# Patient Record
Sex: MALE | Race: WHITE | Employment: FULL TIME | URBAN - METROPOLITAN AREA
[De-identification: names, ages, dates, MRNs, and addresses within clinical notes are randomized per-mention and may not be internally consistent; named-entity substitution may affect disease eponyms.]

---

## 2019-03-11 PROBLEM — E66.01 SEVERE OBESITY (HCC): Status: ACTIVE | Noted: 2019-03-11

## 2019-03-20 ENCOUNTER — HOSPITAL ENCOUNTER (OUTPATIENT)
Dept: SURGERY | Age: 44
Discharge: HOME | End: 2019-03-20

## 2019-03-21 VITALS — WEIGHT: 275 LBS | HEIGHT: 70 IN | BODY MASS INDEX: 39.37 KG/M2

## 2019-03-21 RX ORDER — LISINOPRIL AND HYDROCHLOROTHIAZIDE 10; 12.5 MG/1; MG/1
1 TABLET ORAL DAILY
COMMUNITY

## 2019-03-21 NOTE — PERIOP NOTES
Patient verified name and . Order for consent IS found in EHR and matches case posting; patient verifies procedure. Type 1B surgery, Phone assessment complete. Orders were received. Labs per surgeon: none  Labs per anesthesia protocol: K+      Patient answered medical/surgical history questions at their best of ability. All prior to admission medications documented in [facility]. Patient instructed to take the following medications the day of surgery according to anesthesia guidelines with a small sip of water: lipitor . Hold all vitamins 7 days prior to surgery and NSAIDS 5 days prior to surgery. Prescription meds to hold:prilosec    Patient instructed on the following:  Arrive at [address] [facility], time of arrival to be called the day before by 1700  NPO after midnight including gum, mints, and ice chips  Responsible adult must drive patient to the hospital, stay during surgery, and patient will need supervision 24 hours after anesthesia  Use antibacterial soap in shower the night before surgery and on the morning of surgery  All piercings must be removed prior to arrival.    Leave all valuables (money and jewelry) at home but bring insurance card and ID on       DOS. Do not wear make-up, nail polish, lotions, cologne, perfumes, powders, or oil on skin. Patient teach back successful and patient demonstrates knowledge of instruction.

## 2019-03-26 ENCOUNTER — ANESTHESIA EVENT (OUTPATIENT)
Dept: SURGERY | Age: 44
End: 2019-03-26
Payer: COMMERCIAL

## 2019-03-26 RX ORDER — FENTANYL CITRATE 50 UG/ML
100 INJECTION, SOLUTION INTRAMUSCULAR; INTRAVENOUS ONCE
Status: CANCELLED | OUTPATIENT
Start: 2019-03-26 | End: 2019-03-26

## 2019-03-27 ENCOUNTER — HOSPITAL ENCOUNTER (OUTPATIENT)
Age: 44
Setting detail: OUTPATIENT SURGERY
Discharge: HOME | End: 2019-03-27
Attending: OTOLARYNGOLOGY | Admitting: OTOLARYNGOLOGY
Payer: COMMERCIAL

## 2019-03-27 ENCOUNTER — ANESTHESIA (OUTPATIENT)
Dept: SURGERY | Age: 44
End: 2019-03-27
Payer: COMMERCIAL

## 2019-03-27 VITALS
WEIGHT: 277.6 LBS | HEIGHT: 70 IN | DIASTOLIC BLOOD PRESSURE: 99 MMHG | OXYGEN SATURATION: 94 % | RESPIRATION RATE: 16 BRPM | BODY MASS INDEX: 39.74 KG/M2 | TEMPERATURE: 98.6 F | SYSTOLIC BLOOD PRESSURE: 152 MMHG | HEART RATE: 73 BPM

## 2019-03-27 LAB
GLUCOSE BLD STRIP.AUTO-MCNC: 120 MG/DL (ref 65–100)
POTASSIUM BLD-SCNC: 3.9 MMOL/L (ref 3.5–5.1)

## 2019-03-27 PROCEDURE — 74011250637 HC RX REV CODE- 250/637: Performed by: OTOLARYNGOLOGY

## 2019-03-27 PROCEDURE — 76210000016 HC OR PH I REC 1 TO 1.5 HR: Performed by: OTOLARYNGOLOGY

## 2019-03-27 PROCEDURE — 76060000031 HC ANESTHESIA FIRST 0.5 HR: Performed by: OTOLARYNGOLOGY

## 2019-03-27 PROCEDURE — 77030006671 HC BLD MYRIN BVR BD -A: Performed by: OTOLARYNGOLOGY

## 2019-03-27 PROCEDURE — 84132 ASSAY OF SERUM POTASSIUM: CPT

## 2019-03-27 PROCEDURE — 74011250636 HC RX REV CODE- 250/636

## 2019-03-27 PROCEDURE — 76210000020 HC REC RM PH II FIRST 0.5 HR: Performed by: OTOLARYNGOLOGY

## 2019-03-27 PROCEDURE — 82962 GLUCOSE BLOOD TEST: CPT

## 2019-03-27 PROCEDURE — 77030020407 HC IV BLD WRMR ST 3M -A: Performed by: ANESTHESIOLOGY

## 2019-03-27 PROCEDURE — 76010000154 HC OR TIME FIRST 0.5 HR: Performed by: OTOLARYNGOLOGY

## 2019-03-27 PROCEDURE — 77030008669: Performed by: OTOLARYNGOLOGY

## 2019-03-27 PROCEDURE — 74011250636 HC RX REV CODE- 250/636: Performed by: ANESTHESIOLOGY

## 2019-03-27 PROCEDURE — 77030010509 HC AIRWY LMA MSK TELE -A: Performed by: ANESTHESIOLOGY

## 2019-03-27 DEVICE — IMPLANTABLE DEVICE: Type: IMPLANTABLE DEVICE | Site: EAR | Status: FUNCTIONAL

## 2019-03-27 RX ORDER — SODIUM CHLORIDE 9 MG/ML
50 INJECTION, SOLUTION INTRAVENOUS CONTINUOUS
Status: DISCONTINUED | OUTPATIENT
Start: 2019-03-27 | End: 2019-03-27 | Stop reason: HOSPADM

## 2019-03-27 RX ORDER — SODIUM CHLORIDE, SODIUM LACTATE, POTASSIUM CHLORIDE, CALCIUM CHLORIDE 600; 310; 30; 20 MG/100ML; MG/100ML; MG/100ML; MG/100ML
150 INJECTION, SOLUTION INTRAVENOUS CONTINUOUS
Status: DISCONTINUED | OUTPATIENT
Start: 2019-03-27 | End: 2019-03-27 | Stop reason: HOSPADM

## 2019-03-27 RX ORDER — HYDROCODONE BITARTRATE AND ACETAMINOPHEN 5; 325 MG/1; MG/1
1 TABLET ORAL AS NEEDED
Status: DISCONTINUED | OUTPATIENT
Start: 2019-03-27 | End: 2019-03-27 | Stop reason: HOSPADM

## 2019-03-27 RX ORDER — HYDROMORPHONE HYDROCHLORIDE 2 MG/ML
0.5 INJECTION, SOLUTION INTRAMUSCULAR; INTRAVENOUS; SUBCUTANEOUS
Status: DISCONTINUED | OUTPATIENT
Start: 2019-03-27 | End: 2019-03-27 | Stop reason: HOSPADM

## 2019-03-27 RX ORDER — ONDANSETRON 2 MG/ML
INJECTION INTRAMUSCULAR; INTRAVENOUS AS NEEDED
Status: DISCONTINUED | OUTPATIENT
Start: 2019-03-27 | End: 2019-03-27 | Stop reason: HOSPADM

## 2019-03-27 RX ORDER — FAMOTIDINE 20 MG/1
20 TABLET, FILM COATED ORAL ONCE
Status: DISCONTINUED | OUTPATIENT
Start: 2019-03-27 | End: 2019-03-27 | Stop reason: HOSPADM

## 2019-03-27 RX ORDER — CIPROFLOXACIN 0.5 MG/.25ML
SOLUTION/ DROPS AURICULAR (OTIC) AS NEEDED
Status: DISCONTINUED | OUTPATIENT
Start: 2019-03-27 | End: 2019-03-27 | Stop reason: HOSPADM

## 2019-03-27 RX ORDER — LIDOCAINE HYDROCHLORIDE 10 MG/ML
0.1 INJECTION INFILTRATION; PERINEURAL AS NEEDED
Status: DISCONTINUED | OUTPATIENT
Start: 2019-03-27 | End: 2019-03-27 | Stop reason: HOSPADM

## 2019-03-27 RX ORDER — ACETAMINOPHEN 500 MG
1000 TABLET ORAL
Status: DISCONTINUED | OUTPATIENT
Start: 2019-03-27 | End: 2019-03-27 | Stop reason: HOSPADM

## 2019-03-27 RX ORDER — PROPOFOL 10 MG/ML
INJECTION, EMULSION INTRAVENOUS AS NEEDED
Status: DISCONTINUED | OUTPATIENT
Start: 2019-03-27 | End: 2019-03-27 | Stop reason: HOSPADM

## 2019-03-27 RX ORDER — MIDAZOLAM HYDROCHLORIDE 1 MG/ML
2 INJECTION, SOLUTION INTRAMUSCULAR; INTRAVENOUS
Status: COMPLETED | OUTPATIENT
Start: 2019-03-27 | End: 2019-03-27

## 2019-03-27 RX ORDER — FENTANYL CITRATE 50 UG/ML
INJECTION, SOLUTION INTRAMUSCULAR; INTRAVENOUS AS NEEDED
Status: DISCONTINUED | OUTPATIENT
Start: 2019-03-27 | End: 2019-03-27 | Stop reason: HOSPADM

## 2019-03-27 RX ADMIN — HYDROMORPHONE HYDROCHLORIDE 0.5 MG: 2 INJECTION, SOLUTION INTRAMUSCULAR; INTRAVENOUS; SUBCUTANEOUS at 10:22

## 2019-03-27 RX ADMIN — FENTANYL CITRATE 50 MCG: 50 INJECTION, SOLUTION INTRAMUSCULAR; INTRAVENOUS at 09:47

## 2019-03-27 RX ADMIN — LIDOCAINE HYDROCHLORIDE 0.1 ML: 10 INJECTION, SOLUTION INFILTRATION; PERINEURAL at 08:20

## 2019-03-27 RX ADMIN — ONDANSETRON 4 MG: 2 INJECTION INTRAMUSCULAR; INTRAVENOUS at 09:47

## 2019-03-27 RX ADMIN — PROPOFOL 200 MG: 10 INJECTION, EMULSION INTRAVENOUS at 09:47

## 2019-03-27 RX ADMIN — FENTANYL CITRATE 50 MCG: 50 INJECTION, SOLUTION INTRAMUSCULAR; INTRAVENOUS at 09:39

## 2019-03-27 RX ADMIN — MIDAZOLAM 2 MG: 1 INJECTION INTRAMUSCULAR; INTRAVENOUS at 08:39

## 2019-03-27 RX ADMIN — SODIUM CHLORIDE, SODIUM LACTATE, POTASSIUM CHLORIDE, AND CALCIUM CHLORIDE 150 ML/HR: 600; 310; 30; 20 INJECTION, SOLUTION INTRAVENOUS at 08:20

## 2019-03-27 NOTE — ANESTHESIA PREPROCEDURE EVALUATION
Anesthetic History   No history of anesthetic complications            Review of Systems / Medical History  Patient summary reviewed, nursing notes reviewed and pertinent labs reviewed    Pulmonary  Within defined limits                 Neuro/Psych   Within defined limits           Cardiovascular    Hypertension: well controlled              Exercise tolerance: >4 METS     GI/Hepatic/Renal     GERD: well controlled           Endo/Other    Diabetes: well controlled, type 2    Morbid obesity     Other Findings              Physical Exam    Airway  Mallampati: III  TM Distance: 4 - 6 cm  Neck ROM: normal range of motion   Mouth opening: Normal     Cardiovascular  Regular rate and rhythm,  S1 and S2 normal,  no murmur, click, rub, or gallop  Rhythm: regular  Rate: normal         Dental  No notable dental hx       Pulmonary  Breath sounds clear to auscultation               Abdominal         Other Findings            Anesthetic Plan    ASA: 3  Anesthesia type: general          Induction: Intravenous  Anesthetic plan and risks discussed with: Patient and Spouse

## 2019-03-27 NOTE — ANESTHESIA POSTPROCEDURE EVALUATION
Procedure(s):  RIGHT T TUBE PLACEMENT. general    Anesthesia Post Evaluation      Multimodal analgesia: multimodal analgesia not used between 6 hours prior to anesthesia start to PACU discharge  Patient location during evaluation: bedside  Patient participation: complete - patient participated  Level of consciousness: awake and alert  Pain management: adequate  Airway patency: patent  Anesthetic complications: no  Cardiovascular status: hemodynamically stable  Respiratory status: spontaneous ventilation  Hydration status: euvolemic  Comments: Patient stable and may discharge at this time.         Vitals Value Taken Time   /99 3/27/2019 11:00 AM   Temp 37 °C (98.6 °F) 3/27/2019 10:00 AM   Pulse 73 3/27/2019 11:00 AM   Resp 16 3/27/2019 11:00 AM   SpO2 94 % 3/27/2019 11:00 AM

## 2019-03-27 NOTE — BRIEF OP NOTE
BRIEF OPERATIVE NOTE Date of Procedure: 3/27/2019 Preoperative Diagnosis: Right otitis media with effusion [H65.91] Postoperative Diagnosis: Right otitis media with effusion [H65.91] Procedure(s): RIGHT T TUBE PLACEMENT Surgeon(s) and Role: Mariana Love MD - Primary Surgical Staff: 
Circ-1: Abby Grewal RN Scrub Tech-2: Clifford Soto Event Time In Time Out Incision Start [phone] Incision Close 1547 Anesthesia: General  
 
IVF: 200 cc Estimated Blood Loss: minimal 
 
Specimens: * No specimens in log * Findings: obstructed t-tube w/ serous effusion on R side Complications: none Implants:  
Implant Name Type Inv. Item Serial No.  Lot No. LRB No. Used Action TUBE EAR VNT T 5X8 1. 14MM --  - [id number]  TUBE EAR VNT T 5X8 1. 14MM --  [id number] [employer] [id number] Right 1 Implanted

## 2019-03-27 NOTE — OP NOTES
[place]  OPERATIVE REPORT    Name:  Angela Sanders  MR#:  [id number]  :  1975  ACCOUNT #:  [de-identified]  DATE OF SERVICE:  2019    PREOPERATIVE DIAGNOSIS:  Right-sided chronic otitis media with effusion. POSTOPERATIVE DIAGNOSIS:   Right-sided chronic otitis media with effusion. PROCEDURE PERFORMED:  Right T-tube placement. SURGEON:  Osvaldo Patrick. Flip Rutledge MD    ANESTHESIA:  General endotracheal (LMA)    ANESTHESIOLOGIST:  Franck Lozada. Grant Disla MD    COMPLICATIONS:  None. SPECIMENS REMOVED:  None. ESTIMATED BLOOD LOSS:  Minimal.    OPERATIVE FINDINGS:  The previously placed T-tube was obstructed and there was a serous middle ear effusion. The tube was removed and a new modified Reynolds T-tube was placed. IV FLUID:  200 mL of crystalloid. DRAINS:  None. DISPOSITION:  PACU, then home. CONDITION:  Stable. BRIEF HISTORY:  The patient is a 71-year-old male, who has a long history of right-sided chronic otitis media with effusion. I placed a T-tube in his ear over 2 years ago and he had done great until recently, when he had noted some fullness and muffled hearing on that side. He was examined in the office and the tube had become obstructed and was nonfunctional.  He had re-accumulated the effusion. Therefore, the decision was made to take him to the operating room for placement of a new tube. DESCRIPTION OF PROCEDURE:  The patient was brought back to the operating room and placed on the table in a supine position. General endotracheal anesthesia was inducted without any complications. A laryngeal mask airway was placed, and once he was adequately sedated, the operating microscope was brought into the field. I began on the right side. There was a normal-appearing external ear and a clean ear canal with no cerumen or other debris. There was a T-tube in place, which was obstructed and completely crusted over.   This was carefully removed with a Menezes needle and Alligator forceps. There was a small amount of granulation tissue seen at the myringotomy site, which was lightly debrided using suction and the Menezes needle. I enlarged the myringotomy anteriorly and after suctioning and irrigating out the middle ear space, a new modified Reynolds T-tube was placed without difficulty. Ciprodex ear drops were applied followed by a small piece of cotton within the external auditory meatus. This concluded the surgical portion of the procedure. The patient was then awakened from anesthesia and taken to the PACU in stable condition afterwards.       Erendira Edwards MD      [id number]  D:  03/27/2019 9:58  T:  03/27/2019 10:30  JOB #:  [id number]

## 2019-03-27 NOTE — H&P
45 yo male w/ long h/o R OME. He is s/p t-tube placement back in '16. I had placed a RB tube in office initially but it fell out early and we placed a t-tube instead. He had done well until past wk or so when his R ear has stopped up and there is muffled hearing as well. Denies any sxs on L side. Past Medical History:  
Diagnosis Date  Diabetes ([facility] [address] Type, oral med, no home checks  GERD (gastroesophageal reflux disease) prilosec controls  Hearing loss, mixed conductive and sensorineural   
 Hypertension  Middle ear effusion  SNHL (sensorineural hearing loss) Past Surgical History:  
Procedure Laterality Date  HX TYMPANOSTOMY Right 08/24/2016 Social History Socioeconomic History  Marital status:  Spouse name: Not on file  Number of children: Not on file  Years of education: Not on file  Highest education level: Not on file Occupational History  Not on file Social Needs  Financial resource strain: Not on file  Food insecurity:  
  Worry: Not on file Inability: Not on file  Transportation needs:  
  Medical: Not on file Non-medical: Not on file Tobacco Use  Smoking status: Never Smoker  Smokeless tobacco: Never Used Substance and Sexual Activity  Alcohol use: Yes Comment: moderate; 1 case a week  Drug use: No  
 Sexual activity: Not on file Lifestyle  Physical activity:  
  Days per week: Not on file Minutes per session: Not on file  Stress: Not on file Relationships  Social connections:  
  Talks on phone: Not on file Gets together: Not on file Attends Nondenominational service: Not on file Active member of club or organization: Not on file Attends meetings of clubs or organizations: Not on file Relationship status: Not on file  Intimate partner violence:  
  Fear of current or ex partner: Not on file Emotionally abused: Not on file Physically abused: Not on file Forced sexual activity: Not on file Other Topics Concern  Not on file Social History Narrative  Not on file Family History Problem Relation Age of Onset  No Known Problems Mother  No Known Problems Father No Known Allergies No current facility-administered medications on file prior to encounter. Current Outpatient Medications on File Prior to Encounter Medication Sig Dispense Refill  atorvastatin (LIPITOR) 40 mg tablet Take 40 mg by mouth. Take / use AM day of surgery  per anesthesia protocols.  glucose blood VI test strips (ASCENSIA AUTODISC VI, ONE TOUCH ULTRA TEST VI) strip by Not Applicable route.  Blood-Glucose Meter (ACCU-CHEK RANDY PLUS METER) [facility] USE AS INSTRUCTED  metFORMIN ER (GLUCOPHAGE XR) 500 mg tablet Take 500 mg by mouth.  omeprazole (PRILOSEC OTC) 20 mg tablet Take 20 mg by mouth daily. Take / use AM day of surgery  per anesthesia protocols. Indications: GASTROESOPHAGEAL REFLUX EXAM: 
Visit Vitals /86 (BP 1 Location: Right arm, BP Patient Position: At rest;Sitting) Pulse 79 Temp 98.1 °F (36.7 °C) Resp 16 Ht 5' 10\" (1.778 m) Wt 277 lb 9.6 oz (125.9 kg) SpO2 97% BMI 39.83 kg/m² General: NAD, well-appearing Neuro: No gross neuro deficits. No facial weakness. Eyes: No periorbital edema/ecchymosis. No nystagmus. Skin: No facial erythema, rashes or concerning lesions. Nose: No external deviations or saddling. Intranasally, septum is midline without perforations, nasal mucosa appears healthy with no erythema, mucopurulence, or polyps. Mouth: Moist mucus membranes, normal tongue/palate mobility, no concerning mucosal lesions. Oropharynx clear with no erythema/exudate, no tonsillar hypertrophy. Ears: Normal appearing auricles, no hematomas. R side- t-tube sitting obliquely oriented in TM- still in place but crusted over w/ non-patent lumen. ME space is serous ESTRELLITA present.  L side- no tube, intact TM, clear ME space. Neck: Soft, supple, no palpable neck masses. No thyromegaly or palpable thyroid nodules. Lymphatics: No palpable cervical LAD. Resp: No audible stridor or wheezing. Extremities: No clubbing or cyanosis. A/P: 
His previously placed R t-tube has crusted over and he had a persistent R ESTRELLITA and flat tymp. Therefore, I recommend taking him to OR for placement of a new R t-tube. I discussed all the risks of surgery including otorrhea, rejection of tubes, hearing loss, TM perforation, and need for further procedures, and he would like to proceed.  
  
HTC

## 2022-03-19 PROBLEM — E66.01 SEVERE OBESITY (HCC): Status: ACTIVE | Noted: 2019-03-11

## 2022-08-08 ENCOUNTER — OFFICE VISIT (OUTPATIENT)
Dept: ENT CLINIC | Age: 47
End: 2022-08-08
Payer: COMMERCIAL

## 2022-08-08 VITALS
WEIGHT: 293 LBS | BODY MASS INDEX: 41.02 KG/M2 | HEIGHT: 71 IN | DIASTOLIC BLOOD PRESSURE: 90 MMHG | SYSTOLIC BLOOD PRESSURE: 150 MMHG

## 2022-08-08 DIAGNOSIS — R04.0 EPISTAXIS: Primary | ICD-10-CM

## 2022-08-08 DIAGNOSIS — H90.6 MIXED HEARING LOSS, BILATERAL: ICD-10-CM

## 2022-08-08 DIAGNOSIS — Z45.89 TYMPANOSTOMY TUBE CHECK: ICD-10-CM

## 2022-08-08 PROCEDURE — 92557 COMPREHENSIVE HEARING TEST: CPT | Performed by: AUDIOLOGIST

## 2022-08-08 PROCEDURE — 99213 OFFICE O/P EST LOW 20 MIN: CPT | Performed by: OTOLARYNGOLOGY

## 2022-08-08 PROCEDURE — 92567 TYMPANOMETRY: CPT | Performed by: AUDIOLOGIST

## 2022-08-08 RX ORDER — ATORVASTATIN CALCIUM 40 MG/1
40 TABLET, FILM COATED ORAL DAILY
COMMUNITY
Start: 2021-10-13 | End: 2022-10-26

## 2022-08-08 ASSESSMENT — ENCOUNTER SYMPTOMS
SHORTNESS OF BREATH: 0
ABDOMINAL PAIN: 0

## 2022-08-08 NOTE — PROGRESS NOTES
Chief Complaint   Patient presents with    Ear Problem     Patient states that he pain in his right ear and has a hard time hearing out of both ears. Epistaxis     Patient states that he was having really bad nosebleeds and that at one point they were everyday. HPI:  Allan Galvez is a 40 y.o. male seen in follow-up for his ears and nose. He has h/o R COM and I had placed a R t-tube in that ear most recently back in Jan '20. He has had several tubes in the past as well. Today, he reports more muffled hearing on R side over past several mo. He had some R sided otorrhea (even green in color) about 2-3 mo ago. There never was any pain at all. The drainage lasted for about 1 mo. That ear is dry now. He tried some ear drops which seemed to help. No sxs on L side. On another note, he also has been dealing w/ recurrent R sided epistaxis- occurring several times a day. He used pressure to help stop the bleeding. Last episode of bleeding was about 2 wks ago. No recent nasal trauma. Has not started any new medications. Past Medical History, Past Surgical History, Family history, Social History, and Medications were all reviewed with the patient today and updated as necessary. No Known Allergies  Patient Active Problem List   Diagnosis    Severe obesity ([address]    Hearing loss, mixed conductive and sensorineural    Middle ear effusion    SNHL (sensorineural hearing loss)     Current Outpatient Medications   Medication Sig    atorvastatin (LIPITOR) 40 MG tablet Take 40 mg by mouth in the morning. lisinopril-hydroCHLOROthiazide (PRINZIDE;ZESTORETIC) 10-12.5 MG per tablet Take 1 tablet by mouth daily    metFORMIN (GLUCOPHAGE-XR) 500 MG extended release tablet Take 500 mg by mouth    omeprazole (PRILOSEC OTC) 20 MG tablet Take 20 mg by mouth daily     No current facility-administered medications for this visit.      Past Medical History:   Diagnosis Date    Diabetes ([address]     Type, oral med, no home checks GERD (gastroesophageal reflux disease)     prilosec controls    Hearing loss, mixed conductive and sensorineural     Hypertension     Middle ear effusion     OME (otitis media with effusion), right     SNHL (sensorineural hearing loss)      Social History     Tobacco Use    Smoking status: Never    Smokeless tobacco: Never   Substance Use Topics    Alcohol use: Yes     Past Surgical History:   Procedure Laterality Date    TYMPANOSTOMY TUBE PLACEMENT Right 03/27/2019    R t-tube placement    TYMPANOSTOMY TUBE PLACEMENT Right 08/24/2016     Family History   Problem Relation Age of Onset    No Known Problems Father     No Known Problems Mother         ROS:    Review of Systems   Constitutional:  Negative for fever. Eyes:  Negative for visual disturbance. Respiratory:  Negative for shortness of breath. Cardiovascular:  Negative for chest pain. Gastrointestinal:  Negative for abdominal pain. Skin:  Negative for rash. Neurological:  Negative for dizziness and headaches. Hematological:  Negative for adenopathy. Psychiatric/Behavioral:  Negative for agitation. PHYSICAL EXAM:    BP (!) 150/90 (Site: Left Upper Arm, Position: Sitting)   Ht 5' 10.5\" (1.791 m)   Wt 293 lb (132.9 kg)   BMI 41.45 kg/m²     General: NAD, well-appearing  Neuro: No gross neuro deficits. No facial weakness. Eyes: No periorbital edema/ecchymosis. No nystagmus. Skin: No facial erythema, rashes or concerning lesions. Nose: No external deviations or saddling. Intranasally, septum is midline without perforations, nasal mucosa appears slightly dry with no erythema, mucopurulence, or polyps. Mouth: Moist mucus membranes, normal tongue/palate mobility, no concerning mucosal lesions. Oropharynx clear with no erythema/exudate, no tonsillar hypertrophy. Ears: Normal appearing auricles, no hematomas. T-tube in place on R side but obliquely oriented and appears obstructed- ME space is dulled.  L side- clear canal, no tube, intact TM, clear ME space. Neck: Soft, supple, no palpable neck masses. No palpable parotid or submandibular masses. No thyromegaly or palpable thyroid nodules. Lymphatics: No palpable cervical LAD. Resp: No audible stridor or wheezing. CV: No murmurs, no JVD. Extremities: No clubbing or cyanosis. ASSESSMENT and PLAN      ICD-10-CM    1. Epistaxis  R04.0       2. Tympanostomy tube check  Z45.89       3. Mixed hearing loss, bilateral  H90.6 COMPREHENSIVE HEARING TEST     TYMPANOMETRY        His t-tube was still in place but obliquely oriented and obstructed. His audiogram revealed significant drop in hearing on both sides. There was a mixed loss on R side, c/w a non-functional tube but the L side was mostly sensorineural. His nasal exam was clear other than some dried mucosa and he has not had any bleeding in past 2 wks. He will ihcrease nasal hydration w/ saline sprays and Vaseline to both nares and call me if the bleeding recurs. At this time, I recommend proceeding w/ replacement of his R t-tube. I discussed all the risks of surgery including otorrhea, rejection of tubes, hearing loss, TM perforation, and need for further procedures, and he would like to proceed. He needs to wait to ensure that his new insurance has kicked in and he will then call [facility] [place] to schedule.      Daryle Haver, MD  8/8/2022    Electronically signed by Daryle Haver, MD on 8/8/2022 at 9:05 AM

## 2022-09-14 ENCOUNTER — TELEPHONE (OUTPATIENT)
Dept: ENT CLINIC | Age: 47
End: 2022-09-14

## 2022-09-14 NOTE — TELEPHONE ENCOUNTER
Left message for patient to call back to schedule surgery. Also asked if patient was able to get insurance straightened out.

## 2022-09-28 ENCOUNTER — PREP FOR PROCEDURE (OUTPATIENT)
Dept: ENT CLINIC | Age: 47
End: 2022-09-28

## 2022-09-28 PROBLEM — H65.91 RIGHT OTITIS MEDIA WITH EFFUSION: Status: ACTIVE | Noted: 2022-09-28

## 2022-10-13 ENCOUNTER — PREP FOR PROCEDURE (OUTPATIENT)
Dept: ENT CLINIC | Age: 47
End: 2022-10-13

## 2022-10-13 DIAGNOSIS — H65.91 RIGHT OTITIS MEDIA WITH EFFUSION: Primary | ICD-10-CM

## 2022-10-21 NOTE — PERIOP NOTE
Patient verified name and . Order for consent found in EHR and matches case posting; patient verifies procedure. Type 1B surgery, PAT phone assessment complete. Orders received. Labs per surgeon: none  Labs per anesthesia protocol: K+ DOS. Pt states unable to come for lae work prior to surgery. Patient answered medical/surgical history questions at their best of ability. All prior to admission medications documented in [facility]. Patient instructed to take the following medications the day of surgery according to anesthesia guidelines with a small sip of water: prilosec and lipitor. On the day before surgery please take Acetaminophen 1000mg in the morning and then again before bed. You may substitute for Tylenol 650 mg. Hold all vitamins 7 days prior to surgery and NSAIDS 5 days prior to surgery. Patient instructed on the following:    > Arrive at A Entrance, time of arrival to be called the day before by 1700  > NPO after midnight, unless otherwise indicated, including gum, mints, and ice chips  > Responsible adult must drive patient to the hospital, stay during surgery, and patient will need supervision 24 hours after anesthesia  > Use antibacterial soap in shower the night before surgery and on the morning of surgery  > All piercings must be removed prior to arrival.    > Leave all valuables (money and jewelry) at home but bring insurance card and ID on DOS.   > You may be required to pay a deductible or co-pay on the day of your procedure. You can pre-pay by calling [phone] if your surgery is at the [facility] or [phone] if your surgery is at the [facility]. > Do not wear make-up, nail polish, lotions, cologne, perfumes, powders, or oil on skin. Artificial nails are not permitted.

## 2022-10-25 ENCOUNTER — ANESTHESIA EVENT (OUTPATIENT)
Dept: SURGERY | Age: 47
End: 2022-10-25
Payer: COMMERCIAL

## 2022-10-25 RX ORDER — OFLOXACIN 3 MG/ML
5 SOLUTION AURICULAR (OTIC) 2 TIMES DAILY
Qty: 10 ML | Refills: 2 | Status: SHIPPED | OUTPATIENT
Start: 2022-10-25 | End: 2022-12-24

## 2022-10-26 ENCOUNTER — ANESTHESIA (OUTPATIENT)
Dept: SURGERY | Age: 47
End: 2022-10-26
Payer: COMMERCIAL

## 2022-10-26 ENCOUNTER — HOSPITAL ENCOUNTER (OUTPATIENT)
Age: 47
Setting detail: OUTPATIENT SURGERY
Discharge: HOME | End: 2022-10-26
Attending: OTOLARYNGOLOGY | Admitting: OTOLARYNGOLOGY
Payer: COMMERCIAL

## 2022-10-26 VITALS
RESPIRATION RATE: 16 BRPM | HEIGHT: 70 IN | TEMPERATURE: 97.4 F | SYSTOLIC BLOOD PRESSURE: 145 MMHG | DIASTOLIC BLOOD PRESSURE: 86 MMHG | BODY MASS INDEX: 39.2 KG/M2 | HEART RATE: 77 BPM | OXYGEN SATURATION: 94 % | WEIGHT: 273.8 LBS

## 2022-10-26 DIAGNOSIS — H65.91 RIGHT OTITIS MEDIA WITH EFFUSION: ICD-10-CM

## 2022-10-26 LAB
GLUCOSE BLD STRIP.AUTO-MCNC: 144 MG/DL (ref 65–100)
POTASSIUM BLD-SCNC: 4.2 MMOL/L (ref 3.5–5.1)
SERVICE CMNT-IMP: ABNORMAL

## 2022-10-26 PROCEDURE — 84132 ASSAY OF SERUM POTASSIUM: CPT

## 2022-10-26 PROCEDURE — 3600000012 HC SURGERY LEVEL 2 ADDTL 15MIN: Performed by: OTOLARYNGOLOGY

## 2022-10-26 PROCEDURE — 3700000000 HC ANESTHESIA ATTENDED CARE: Performed by: OTOLARYNGOLOGY

## 2022-10-26 PROCEDURE — 6360000002 HC RX W HCPCS: Performed by: NURSE ANESTHETIST, CERTIFIED REGISTERED

## 2022-10-26 PROCEDURE — 2709999900 HC NON-CHARGEABLE SUPPLY: Performed by: OTOLARYNGOLOGY

## 2022-10-26 PROCEDURE — 6370000000 HC RX 637 (ALT 250 FOR IP): Performed by: OTOLARYNGOLOGY

## 2022-10-26 PROCEDURE — 82962 GLUCOSE BLOOD TEST: CPT

## 2022-10-26 PROCEDURE — 6360000002 HC RX W HCPCS: Performed by: STUDENT IN AN ORGANIZED HEALTH CARE EDUCATION/TRAINING PROGRAM

## 2022-10-26 PROCEDURE — 3600000002 HC SURGERY LEVEL 2 BASE: Performed by: OTOLARYNGOLOGY

## 2022-10-26 PROCEDURE — 3700000001 HC ADD 15 MINUTES (ANESTHESIA): Performed by: OTOLARYNGOLOGY

## 2022-10-26 PROCEDURE — 7100000010 HC PHASE II RECOVERY - FIRST 15 MIN: Performed by: OTOLARYNGOLOGY

## 2022-10-26 PROCEDURE — 7100000000 HC PACU RECOVERY - FIRST 15 MIN: Performed by: OTOLARYNGOLOGY

## 2022-10-26 PROCEDURE — 2500000003 HC RX 250 WO HCPCS: Performed by: NURSE ANESTHETIST, CERTIFIED REGISTERED

## 2022-10-26 PROCEDURE — 7100000001 HC PACU RECOVERY - ADDTL 15 MIN: Performed by: OTOLARYNGOLOGY

## 2022-10-26 PROCEDURE — L8699 PROSTHETIC IMPLANT NOS: HCPCS | Performed by: OTOLARYNGOLOGY

## 2022-10-26 PROCEDURE — 69436 CREATE EARDRUM OPENING: CPT | Performed by: OTOLARYNGOLOGY

## 2022-10-26 PROCEDURE — 2580000003 HC RX 258: Performed by: STUDENT IN AN ORGANIZED HEALTH CARE EDUCATION/TRAINING PROGRAM

## 2022-10-26 DEVICE — VENT TUBE 1056019 5PK T TUBE 1.14MM
Type: IMPLANTABLE DEVICE | Site: EAR | Status: FUNCTIONAL
Brand: GOODE T-TUBE®

## 2022-10-26 RX ORDER — HYDRALAZINE HYDROCHLORIDE 20 MG/ML
10 INJECTION INTRAMUSCULAR; INTRAVENOUS
Status: DISCONTINUED | OUTPATIENT
Start: 2022-10-26 | End: 2022-10-26 | Stop reason: HOSPADM

## 2022-10-26 RX ORDER — PROPOFOL 10 MG/ML
INJECTION, EMULSION INTRAVENOUS PRN
Status: DISCONTINUED | OUTPATIENT
Start: 2022-10-26 | End: 2022-10-26 | Stop reason: SDUPTHER

## 2022-10-26 RX ORDER — SODIUM CHLORIDE 9 MG/ML
INJECTION, SOLUTION INTRAVENOUS PRN
Status: DISCONTINUED | OUTPATIENT
Start: 2022-10-26 | End: 2022-10-26 | Stop reason: HOSPADM

## 2022-10-26 RX ORDER — LIDOCAINE HYDROCHLORIDE 20 MG/ML
INJECTION, SOLUTION EPIDURAL; INFILTRATION; INTRACAUDAL; PERINEURAL PRN
Status: DISCONTINUED | OUTPATIENT
Start: 2022-10-26 | End: 2022-10-26 | Stop reason: SDUPTHER

## 2022-10-26 RX ORDER — PROCHLORPERAZINE EDISYLATE 5 MG/ML
5 INJECTION INTRAMUSCULAR; INTRAVENOUS
Status: DISCONTINUED | OUTPATIENT
Start: 2022-10-26 | End: 2022-10-26 | Stop reason: HOSPADM

## 2022-10-26 RX ORDER — CIPROFLOXACIN 0.5 MG/.25ML
SOLUTION/ DROPS AURICULAR (OTIC) PRN
Status: DISCONTINUED | OUTPATIENT
Start: 2022-10-26 | End: 2022-10-26 | Stop reason: HOSPADM

## 2022-10-26 RX ORDER — MIDAZOLAM HYDROCHLORIDE 2 MG/2ML
2 INJECTION, SOLUTION INTRAMUSCULAR; INTRAVENOUS
Status: COMPLETED | OUTPATIENT
Start: 2022-10-26 | End: 2022-10-26

## 2022-10-26 RX ORDER — LABETALOL HYDROCHLORIDE 5 MG/ML
10 INJECTION, SOLUTION INTRAVENOUS
Status: DISCONTINUED | OUTPATIENT
Start: 2022-10-26 | End: 2022-10-26 | Stop reason: HOSPADM

## 2022-10-26 RX ORDER — OXYCODONE HYDROCHLORIDE 5 MG/1
5 TABLET ORAL PRN
Status: DISCONTINUED | OUTPATIENT
Start: 2022-10-26 | End: 2022-10-26 | Stop reason: HOSPADM

## 2022-10-26 RX ORDER — LIDOCAINE HYDROCHLORIDE 10 MG/ML
1 INJECTION, SOLUTION INFILTRATION; PERINEURAL
Status: DISCONTINUED | OUTPATIENT
Start: 2022-10-26 | End: 2022-10-26 | Stop reason: HOSPADM

## 2022-10-26 RX ORDER — OXYCODONE HYDROCHLORIDE 5 MG/1
10 TABLET ORAL PRN
Status: DISCONTINUED | OUTPATIENT
Start: 2022-10-26 | End: 2022-10-26 | Stop reason: HOSPADM

## 2022-10-26 RX ORDER — FENTANYL CITRATE 50 UG/ML
25 INJECTION, SOLUTION INTRAMUSCULAR; INTRAVENOUS
Status: DISCONTINUED | OUTPATIENT
Start: 2022-10-26 | End: 2022-10-26 | Stop reason: HOSPADM

## 2022-10-26 RX ORDER — IPRATROPIUM BROMIDE AND ALBUTEROL SULFATE 2.5; .5 MG/3ML; MG/3ML
1 SOLUTION RESPIRATORY (INHALATION)
Status: DISCONTINUED | OUTPATIENT
Start: 2022-10-26 | End: 2022-10-26 | Stop reason: HOSPADM

## 2022-10-26 RX ORDER — SODIUM CHLORIDE, SODIUM LACTATE, POTASSIUM CHLORIDE, CALCIUM CHLORIDE 600; 310; 30; 20 MG/100ML; MG/100ML; MG/100ML; MG/100ML
INJECTION, SOLUTION INTRAVENOUS CONTINUOUS
Status: DISCONTINUED | OUTPATIENT
Start: 2022-10-26 | End: 2022-10-26 | Stop reason: HOSPADM

## 2022-10-26 RX ORDER — ACETAMINOPHEN 500 MG
500 TABLET ORAL EVERY 6 HOURS PRN
COMMUNITY

## 2022-10-26 RX ORDER — DIPHENHYDRAMINE HYDROCHLORIDE 50 MG/ML
12.5 INJECTION INTRAMUSCULAR; INTRAVENOUS
Status: DISCONTINUED | OUTPATIENT
Start: 2022-10-26 | End: 2022-10-26 | Stop reason: HOSPADM

## 2022-10-26 RX ORDER — SODIUM CHLORIDE 0.9 % (FLUSH) 0.9 %
5-40 SYRINGE (ML) INJECTION EVERY 12 HOURS SCHEDULED
Status: DISCONTINUED | OUTPATIENT
Start: 2022-10-26 | End: 2022-10-26 | Stop reason: HOSPADM

## 2022-10-26 RX ORDER — HYDROMORPHONE HYDROCHLORIDE 1 MG/ML
0.5 INJECTION, SOLUTION INTRAMUSCULAR; INTRAVENOUS; SUBCUTANEOUS EVERY 5 MIN PRN
Status: DISCONTINUED | OUTPATIENT
Start: 2022-10-26 | End: 2022-10-26 | Stop reason: HOSPADM

## 2022-10-26 RX ORDER — SODIUM CHLORIDE 0.9 % (FLUSH) 0.9 %
5-40 SYRINGE (ML) INJECTION PRN
Status: DISCONTINUED | OUTPATIENT
Start: 2022-10-26 | End: 2022-10-26 | Stop reason: HOSPADM

## 2022-10-26 RX ORDER — HALOPERIDOL 5 MG/ML
1 INJECTION INTRAMUSCULAR
Status: DISCONTINUED | OUTPATIENT
Start: 2022-10-26 | End: 2022-10-26 | Stop reason: HOSPADM

## 2022-10-26 RX ORDER — FENTANYL CITRATE 50 UG/ML
100 INJECTION, SOLUTION INTRAMUSCULAR; INTRAVENOUS
Status: DISCONTINUED | OUTPATIENT
Start: 2022-10-26 | End: 2022-10-26 | Stop reason: HOSPADM

## 2022-10-26 RX ADMIN — HYDROMORPHONE HYDROCHLORIDE 0.5 MG: 1 INJECTION, SOLUTION INTRAMUSCULAR; INTRAVENOUS; SUBCUTANEOUS at 07:26

## 2022-10-26 RX ADMIN — MIDAZOLAM HYDROCHLORIDE 2 MG: 2 INJECTION, SOLUTION INTRAMUSCULAR; INTRAVENOUS at 06:39

## 2022-10-26 RX ADMIN — LIDOCAINE HYDROCHLORIDE 60 MG: 20 INJECTION, SOLUTION EPIDURAL; INFILTRATION; INTRACAUDAL; PERINEURAL at 06:59

## 2022-10-26 RX ADMIN — SODIUM CHLORIDE, POTASSIUM CHLORIDE, SODIUM LACTATE AND CALCIUM CHLORIDE: 600; 310; 30; 20 INJECTION, SOLUTION INTRAVENOUS at 06:02

## 2022-10-26 RX ADMIN — PROPOFOL 200 MG: 10 INJECTION, EMULSION INTRAVENOUS at 06:59

## 2022-10-26 ASSESSMENT — PAIN DESCRIPTION - LOCATION: LOCATION: EAR

## 2022-10-26 ASSESSMENT — PAIN DESCRIPTION - ORIENTATION: ORIENTATION: RIGHT

## 2022-10-26 ASSESSMENT — PAIN - FUNCTIONAL ASSESSMENT: PAIN_FUNCTIONAL_ASSESSMENT: 0-10

## 2022-10-26 ASSESSMENT — PAIN DESCRIPTION - DESCRIPTORS: DESCRIPTORS: THROBBING

## 2022-10-26 ASSESSMENT — PAIN SCALES - GENERAL: PAINLEVEL_OUTOF10: 7

## 2022-10-26 NOTE — OP NOTE
[facility]  OPERATIVE REPORT    Name:  Oumar Haile  MR#:  [id number]  :  1975  ACCOUNT #:  [de-identified]  DATE OF SERVICE:  10/26/2022    PREOPERATIVE DIAGNOSIS:  Right otitis media with effusion. POSTOPERATIVE DIAGNOSIS:  Right otitis media with effusion. PROCEDURE PERFORMED:  Replacement of right T-tube. SURGEON:  Anaya Daniels MD    ASSISTANT:  none. ANESTHESIA:  General mask. ANESTHESIOLOGIST:  Kwabena Haile MD    COMPLICATIONS:  None. SPECIMENS REMOVED:  None. IMPLANTS:  none. ESTIMATED BLOOD LOSS:  Minimal.    OPERATIVE FINDINGS:  There was an obstructed right T-tube which was removed and there was a thick purulent middle ear effusion present. A new T-tube was placed. IV FLUIDS:  Minimal.    DRAINS:  None. DISPOSITION:  PACU, then home. CONDITION:  Stable. BRIEF HISTORY:  The patient is a 43-year-old male with a long history of a right chronic otitis media with effusion. I had placed a tube in that ear back in 2019. He had done well until recently when it became obstructed and his right middle ear space filled back up with fluid. The decision was made to take him to the operating room for replacement of this obstructed right T-tube. DESCRIPTION OF PROCEDURE:  The patient was brought back to the operating room and placed on the table in the supine position. General mask anesthesia was inducted without any complications. Once the patient was adequately sedated, the operating microscope was brought into the field. I visualized down the right ear canal which was clean with minimal cerumen. There was a T-tube in place anteroinferiorly, but it was completely obstructed and there was a very thickened drum with an obvious middle ear effusion. I used an alligator forceps to remove the previously placed tube. There was mild granulation around the previous myringotomy site which was lightly debrided using a López needle and otologic suction.   There was thick mucopurulent drainage from the middle ear space which was suctioned and irrigated out. Next, I placed a modified Chirinos T-tube within the same myringotomy which had to be enlarged just slightly. Once it was in a good position, Ciprodex ear drops were applied followed by a small piece of cotton within the external auditory meatus. This concluded the surgical portion of the procedure. The patient was then awakened from anesthesia and taken to the PACU in stable condition afterwards.       Gilles Stone MD      [id number]  D:  10/26/2022 7:25  T:  10/26/2022 13:37  JOB #:  [id number]

## 2022-10-26 NOTE — DISCHARGE INSTRUCTIONS
-Please use Ciprodex ear drops- 3 drops TID for 3 days  -No activity restrictions  -Regular diet    After general anesthesia or intravenous sedation, for 24 hours or while taking prescription Narcotics:  Limit your activities  A responsible adult needs to be with you for the next 24 hours  Do not drive and operate hazardous machinery  Do not make important personal or business decisions  Do not drink alcoholic beverages  If you have not urinated within 8 hours after discharge, and you are experiencing discomfort from urinary retention, please go to the nearest ED. If you have sleep apnea and have a CPAP machine, please use it for all naps and sleeping. Please use caution when taking narcotics and any of your home medications that may cause drowsiness. *  Please give a list of your current medications to your Primary Care Provider. *  Please update this list whenever your medications are discontinued, doses are      changed, or new medications (including over-the-counter products) are added. *  Please carry medication information at all times in case of emergency situations. These are general instructions for a healthy lifestyle:  No smoking/ No tobacco products/ Avoid exposure to second hand smoke  Surgeon General's Warning:  Quitting smoking now greatly reduces serious risk to your health. Obesity, smoking, and sedentary lifestyle greatly increases your risk for illness  A healthy diet, regular physical exercise & weight monitoring are important for maintaining a healthy lifestyle    You may be retaining fluid if you have a history of heart failure or if you experience any of the following symptoms:  Weight gain of 3 pounds or more overnight or 5 pounds in a week, increased swelling in our hands or feet or shortness of breath while lying flat in bed. Please call your doctor as soon as you notice any of these symptoms; do not wait until your next office visit.

## 2022-10-26 NOTE — H&P
57 yo male who has h/o R COM and I had placed a R t-tube in that ear most recently back in Jan '20. He has had several tubes in the past as well. Today, he reports more muffled hearing on R side over past several mo. He had some R sided otorrhea (even green in color) about 2-3 mo ago. There never was any pain at all. The drainage lasted for about 1 mo. That ear is dry now. He tried some ear drops which seemed to help. No sxs on L side.      Past Medical History:   Diagnosis Date    Diabetes ([facility] [address]     Type 2, oral med, no home checks    GERD (gastroesophageal reflux disease)     prilosec controls    Hearing loss, mixed conductive and sensorineural     Hypertension     managed with meds    Middle ear effusion     OME (otitis media with effusion), right     SNHL (sensorineural hearing loss)      Past Surgical History:   Procedure Laterality Date    TYMPANOSTOMY TUBE PLACEMENT Right 03/27/2019    R t-tube placement    TYMPANOSTOMY TUBE PLACEMENT Right 08/24/2016     Social History     Socioeconomic History    Marital status:      Spouse name: Not on file    Number of children: Not on file    Years of education: Not on file    Highest education level: Not on file   Occupational History    Not on file   Tobacco Use    Smoking status: Never    Smokeless tobacco: Never   Vaping Use    Vaping Use: Never used   Substance and Sexual Activity    Alcohol use: Yes     Comment: occas    Drug use: No    Sexual activity: Not on file   Other Topics Concern    Not on file   Social History Narrative    Not on file     Social Determinants of Health     Financial Resource Strain: Not on file   Food Insecurity: Not on file   Transportation Needs: Not on file   Physical Activity: Not on file   Stress: Not on file   Social Connections: Not on file   Intimate Partner Violence: Not on file   Housing Stability: Not on file     Family History   Problem Relation Age of Onset    No Known Problems Father     No Known Problems Mother      No Known Allergies    No current facility-administered medications on file prior to encounter. Current Outpatient Medications on File Prior to Encounter   Medication Sig Dispense Refill    acetaminophen (TYLENOL) 500 MG tablet Take 500 mg by mouth every 6 hours as needed for Pain      atorvastatin (LIPITOR) 40 MG tablet Take 40 mg by mouth in the morning. lisinopril-hydroCHLOROthiazide (PRINZIDE;ZESTORETIC) 10-12.5 MG per tablet Take 1 tablet by mouth daily      metFORMIN (GLUCOPHAGE-XR) 500 MG extended release tablet Take 500 mg by mouth daily (with breakfast)      omeprazole (PRILOSEC OTC) 20 MG tablet Take 20 mg by mouth daily       EXAM:  BP (!) 145/88   Pulse 86   Temp 97.9 °F (36.6 °C) (Temporal)   Resp 18   Ht 5' 10\" (1.778 m)   Wt 273 lb 12.8 oz (124.2 kg)   SpO2 96%   BMI 39.29 kg/m²   General: NAD, well-appearing  Neuro: No gross neuro deficits. No facial weakness. Eyes: No periorbital edema/ecchymosis. No nystagmus. Skin: No facial erythema, rashes or concerning lesions. Nose: No external deviations or saddling. Intranasally, septum is midline without perforations, nasal mucosa appears slightly dry with no erythema, mucopurulence, or polyps. Mouth: Moist mucus membranes, normal tongue/palate mobility, no concerning mucosal lesions. Oropharynx clear with no erythema/exudate, no tonsillar hypertrophy. Ears: Normal appearing auricles, no hematomas. T-tube in place on R side but obliquely oriented and appears obstructed- ME space is dulled. L side- clear canal, no tube, intact TM, clear ME space. Neck: Soft, supple, no palpable neck masses. No palpable parotid or submandibular masses. No thyromegaly or palpable thyroid nodules. Lymphatics: No palpable cervical LAD. Resp: No audible stridor or wheezing. CV: No murmurs, no JVD. Extremities: No clubbing or cyanosis. A/P:  His t-tube was still in place but obliquely oriented and obstructed.  His audiogram revealed significant drop in hearing on both sides. There was a mixed loss on R side, c/w a non-functional tube but the L side was mostly sensorineural. . At this time, I recommend proceeding w/ replacement of his R t-tube. I discussed all the risks of surgery including otorrhea, rejection of tubes, hearing loss, TM perforation, and need for further procedures, and he would like to proceed.      [address]

## 2022-10-26 NOTE — BRIEF OP NOTE
Brief Postoperative Note      Patient: Shanel Pak  YOB: 1975  MRN: [id number]    Date of Procedure: 10/26/2022    Pre-Op Diagnosis: Right otitis media with effusion [H65.91]    Post-Op Diagnosis: Same       Procedure(s):   MYRINGOTOMY TUBE INSERTION- R t-tube    Surgeon(s):  Tal Paula MD    Assistant:  * No surgical staff found *    Anesthesia: General    Estimated Blood Loss (mL): Minimal    Complications: None    Specimens:   * No specimens in log *    Implants:  * No implants in log *      Drains: * No LDAs found *    Findings: thick CHANDU w/ obstructed R t-tube    Electronically signed by Tal Paula MD on 10/26/2022 at 7:09 AM

## 2022-10-26 NOTE — ANESTHESIA POSTPROCEDURE EVALUATION
Department of Anesthesiology  Postprocedure Note    Patient: Sacr Gold  MRN: [id number]  YOB: 1975  Date of evaluation: 10/26/2022      Procedure Summary     Date: 10/26/22 Room / Location: [facility] MAIN OR 02 / [facility] MAIN OR    Anesthesia Start: 2035 Anesthesia Stop: 0715    Procedure: MYRINGOTOMY TUBE INSERTION (Right: Ear) Diagnosis:       Right otitis media with effusion      (Right otitis media with effusion [H65.91])    Surgeons: Timothy Coronel MD Responsible Provider: Arabella Vargas MD    Anesthesia Type: general ASA Status: 3          Anesthesia Type: No value filed.     Lamar Phase I: Lamar Score: 10    Lamar Phase II: Lamar Score: 10      Anesthesia Post Evaluation    Patient location during evaluation: bedside  Patient participation: complete - patient participated  Level of consciousness: awake and alert  Pain score: 1  Airway patency: patent  Nausea & Vomiting: no vomiting  Complications: no  Cardiovascular status: hemodynamically stable  Respiratory status: acceptable  Hydration status: euvolemic

## 2022-10-26 NOTE — ANESTHESIA PRE PROCEDURE
Department of Anesthesiology  Preprocedure Note       Name:  Bryson Randhawa   Age:  56 y.o.  :  1975                                          MRN:  [id number]         Date:  10/26/2022      Surgeon: Sanju Kowalski):  Matteo Soliz MD    Procedure: Procedure(s): MYRINGOTOMY TUBE INSERTION    Medications prior to admission:   Prior to Admission medications    Medication Sig Start Date End Date Taking? Authorizing Provider   acetaminophen (TYLENOL) 500 MG tablet Take 500 mg by mouth every 6 hours as needed for Pain   Yes Historical Provider, MD   ofloxacin (FLOXIN) 0.3 % otic solution Place 5 drops into both ears 2 times daily 10/25/22 12/24/22  Matteo Soliz MD   atorvastatin (LIPITOR) 40 MG tablet Take 40 mg by mouth in the morning.  10/13/21 10/26/22  Historical Provider, MD   lisinopril-hydroCHLOROthiazide (PRINZIDE;ZESTORETIC) 10-12.5 MG per tablet Take 1 tablet by mouth daily    Ar Automatic Reconciliation   metFORMIN (GLUCOPHAGE-XR) 500 MG extended release tablet Take 500 mg by mouth daily (with breakfast) 1/3/19   Ar Automatic Reconciliation   omeprazole (PRILOSEC OTC) 20 MG tablet Take 20 mg by mouth daily    Ar Automatic Reconciliation       Current medications:    Current Facility-Administered Medications   Medication Dose Route Frequency Provider Last Rate Last Admin    lidocaine 1 % injection 1 mL  1 mL IntraDERmal Once PRN Carlotta Ludwig MD        fentaNYL (SUBLIMAZE) injection 25 mcg  25 mcg IntraVENous Once PRN Carlotta Ludwig MD        Or    fentaNYL (SUBLIMAZE) injection 100 mcg  100 mcg IntraVENous Once PRN Carlotta Ludwig MD        lactated ringers infusion   IntraVENous Continuous Carlotta Ludwig  mL/hr at 10/26/22 0602 New Bag at 10/26/22 0602    sodium chloride flush 0.9 % injection 5-40 mL  5-40 mL IntraVENous 2 times per day Carlotta Ludwig MD        sodium chloride flush 0.9 % injection 5-40 mL  5-40 mL IntraVENous PRN Carlotta Ludwig MD        0.9 % sodium chloride infusion IntraVENous PRN Esthela Draper MD           Allergies:  No Known Allergies    Problem List:    Patient Active Problem List   Diagnosis Code    [facility] E66.01    Hearing loss, mixed conductive and sensorineural H90.8    Middle ear effusion H65.90    SNHL (sensorineural hearing loss) H90.5    Right otitis media with effusion H65.91       Past Medical History:        Diagnosis Date    Diabetes ([facility] [address]     Type 2, oral med, no home checks    GERD (gastroesophageal reflux disease)     prilosec controls    Hearing loss, mixed conductive and sensorineural     Hypertension     managed with meds    Middle ear effusion     OME (otitis media with effusion), right     SNHL (sensorineural hearing loss)        Past Surgical History:        Procedure Laterality Date    TYMPANOSTOMY TUBE PLACEMENT Right 03/27/2019    R t-tube placement    TYMPANOSTOMY TUBE PLACEMENT Right 08/24/2016       Social History:    Social History     Tobacco Use    Smoking status: Never    Smokeless tobacco: Never   Substance Use Topics    Alcohol use: Yes     Comment: occas                                Counseling given: Not Answered      Vital Signs (Current):   Vitals:    10/21/22 1244 10/26/22 0530   BP:  (!) 145/88   Pulse:  86   Resp:  18   Temp:  97.9 °F (36.6 °C)   TempSrc:  Temporal   SpO2:  96%   Weight: 280 lb (127 kg) 273 lb 12.8 oz (124.2 kg)   Height: 5' 10\" (1.778 m)                                               BP Readings from Last 3 Encounters:   10/26/22 (!) 145/88   08/08/22 (!) 150/90       NPO Status: Time of last liquid consumption: 2100                        Time of last solid consumption: 2100                        Date of last liquid consumption: 10/25/22                        Date of last solid food consumption: 10/25/22    BMI:   Wt Readings from Last 3 Encounters:   10/26/22 273 lb 12.8 oz (124.2 kg)   08/08/22 293 lb (132.9 kg)     Body mass index is 39.29 kg/m².     CBC: No results found for: WBC, RBC, HGB, HCT, MCV, RDW, PLT    CMP: No results found for: NA, K, CL, CO2, BUN, CREATININE, GFRAA, AGRATIO, LABGLOM, GLUCOSE, GLU, PROT, CALCIUM, BILITOT, ALKPHOS, AST, ALT    POC Tests:   Recent Labs     10/26/22  0606 10/26/22  0612   POCGLU 144*  --    POCK  --  4.2       Coags: No results found for: PROTIME, INR, APTT    HCG (If Applicable): No results found for: PREGTESTUR, PREGSERUM, HCG, HCGQUANT     ABGs: No results found for: PHART, PO2ART, PGZ8KHW, XVI6DBB, BEART, V8LKOLIA     Type & Screen (If Applicable):  No results found for: LABABO, LABRH    Drug/Infectious Status (If Applicable):  No results found for: HIV, HEPCAB    COVID-19 Screening (If Applicable): No results found for: COVID19        Anesthesia Evaluation  Patient summary reviewed and Nursing notes reviewed no history of anesthetic complications:   Airway: Mallampati: III  TM distance: >3 FB   Neck ROM: full  Comment: beard  Mouth opening: > = 3 FB   Dental: normal exam         Pulmonary:Negative Pulmonary ROS and normal exam                               Cardiovascular:  Exercise tolerance: good (>4 METS),   (+) hypertension:,                   Neuro/Psych:   Negative Neuro/Psych ROS              GI/Hepatic/Renal:   (+) GERD: well controlled, morbid obesity          Endo/Other:    (+) DiabetesType II DM, , .                 Abdominal:             Vascular: negative vascular ROS. Other Findings:           Anesthesia Plan      general     ASA 3       Induction: intravenous. Anesthetic plan and risks discussed with patient.                         Bárbara Adame MD   10/26/2022

## (undated) DEVICE — DRAPE TWL SURG 16X26IN BLU ORB04] ALLCARE INC]

## (undated) DEVICE — COTTON BALLS 10/PK: Brand: CARDINAL HEALTH

## (undated) DEVICE — GLOVE ORANGE PI 7 1/2   MSG9075

## (undated) DEVICE — STERILE COTTON BALLS LARGE 5/P: Brand: MEDLINE

## (undated) DEVICE — COTTON BALLS: Brand: DEROYAL

## (undated) DEVICE — BLADE BEAV SPEAR TIP 45 DEG --

## (undated) DEVICE — BLADE MYR OFFSET 45DEG SPEAR TIP NAR SHFT W/ RND KNURLED

## (undated) DEVICE — TUBING, SUCTION, 1/4" X 10', STRAIGHT: Brand: MEDLINE

## (undated) DEVICE — MEDI-VAC NON-CONDUCTIVE SUCTION TUBING: Brand: CARDINAL HEALTH

## (undated) DEVICE — CANISTER, RIGID, 2000CC: Brand: MEDLINE INDUSTRIES, INC.

## (undated) DEVICE — 2000CC GUARDIAN II: Brand: GUARDIAN